# Patient Record
Sex: MALE | Race: WHITE | NOT HISPANIC OR LATINO | URBAN - METROPOLITAN AREA
[De-identification: names, ages, dates, MRNs, and addresses within clinical notes are randomized per-mention and may not be internally consistent; named-entity substitution may affect disease eponyms.]

---

## 2017-01-10 ENCOUNTER — APPOINTMENT (RX ONLY)
Dept: URBAN - METROPOLITAN AREA CLINIC 343 | Facility: CLINIC | Age: 72
Setting detail: DERMATOLOGY
End: 2017-01-10

## 2017-01-10 PROBLEM — Z85.828 PERSONAL HISTORY OF OTHER MALIGNANT NEOPLASM OF SKIN: Status: ACTIVE | Noted: 2017-01-10

## 2017-01-10 PROBLEM — C44.612 BASAL CELL CARCINOMA OF SKIN OF RIGHT UPPER LIMB, INCLUDING SHOULDER: Status: ACTIVE | Noted: 2017-01-10

## 2017-01-10 PROBLEM — I10 ESSENTIAL (PRIMARY) HYPERTENSION: Status: ACTIVE | Noted: 2017-01-10

## 2017-01-10 PROBLEM — E13.9 OTHER SPECIFIED DIABETES MELLITUS WITHOUT COMPLICATIONS: Status: ACTIVE | Noted: 2017-01-10

## 2017-01-10 PROBLEM — I63.50 CEREBRAL INFARCTION DUE TO UNSPECIFIED OCCLUSION OR STENOSIS OF UNSPECIFIED CEREBRAL ARTERY: Status: ACTIVE | Noted: 2017-01-10

## 2017-01-10 PROBLEM — L57.0 ACTINIC KERATOSIS: Status: ACTIVE | Noted: 2017-01-10

## 2017-01-10 PROBLEM — M12.9 ARTHROPATHY, UNSPECIFIED: Status: ACTIVE | Noted: 2017-01-10

## 2017-01-10 PROCEDURE — 17262 DSTRJ MAL LES T/A/L 1.1-2.0: CPT

## 2017-01-10 PROCEDURE — ? CURETTAGE AND DESTRUCTION

## 2017-01-10 NOTE — PROCEDURE: CURETTAGE AND DESTRUCTION
Bill For Surgical Tray: no
Size Of Lesion After Curettage: 1.5
Bill As A Line Item Or As Units: Line Item
Cautery Type: electrodesiccation
Anesthesia Volume In Cc: 3
Consent was obtained from the patient. The risks, benefits and alternatives to therapy were discussed in detail. Specifically, the risks of infection, scarring, bleeding, prolonged wound healing, nerve injury, incomplete removal, allergy to anesthesia and recurrence were addressed. Alternatives to ED&C, such as: surgical removal and XRT were also discussed.  Prior to the procedure, the treatment site was clearly identified and confirmed by the patient. All components of Universal Protocol/PAUSE Rule completed.
What Was Performed First?: Curettage
Size Of Lesion In Cm: 1.2
Additional Information: (Optional): The wound was cleaned, and a band-aid was applied. The patient received detailed post-op instructions.
Detail Level: Detailed
Body Location Override (Optional - Billing Will Still Be Based On Selected Body Map Location If Applicable): right lateral shoulder
Post-Care Instructions: I reviewed with the patient in detail post-care instructions. Patient is to keep the area dry for 48 hours, and not to engage in any swimming until the area is healed. Should the patient develop any fevers, chills, bleeding, severe pain patient will contact the office immediately.
Anesthesia Type: 1% lidocaine with epinephrine

## 2017-08-14 ENCOUNTER — ESTABLISHED PATIENT (OUTPATIENT)
Dept: URBAN - METROPOLITAN AREA CLINIC 36 | Facility: CLINIC | Age: 72
End: 2017-08-14

## 2017-08-14 DIAGNOSIS — Z79.4: ICD-10-CM

## 2017-08-14 DIAGNOSIS — E11.9: ICD-10-CM

## 2017-08-14 DIAGNOSIS — H26.491: ICD-10-CM

## 2017-08-14 PROCEDURE — 3072F LOW RISK FOR RETINOPATHY: CPT

## 2017-08-14 PROCEDURE — 1036F TOBACCO NON-USER: CPT

## 2017-08-14 PROCEDURE — G8785 BP SCRN NO PERF AT INTERVAL: HCPCS

## 2017-08-14 PROCEDURE — 2022F DILAT RTA XM EVC RTNOPTHY: CPT

## 2017-08-14 PROCEDURE — 92015 DETERMINE REFRACTIVE STATE: CPT

## 2017-08-14 PROCEDURE — 92014 COMPRE OPH EXAM EST PT 1/>: CPT

## 2017-08-14 PROCEDURE — G8427 DOCREV CUR MEDS BY ELIG CLIN: HCPCS

## 2017-08-14 ASSESSMENT — VISUAL ACUITY
OD_PH: 20/30
OS_SC: 20/25-1
OS_SC: J3
OS_CC: J1
OD_SC: J3
OD_SC: 20/40-2
OD_CC: J1

## 2017-08-14 ASSESSMENT — TONOMETRY
OS_IOP_MMHG: 13
OD_IOP_MMHG: 12

## 2020-08-19 ENCOUNTER — ESTABLISHED COMPREHENSIVE EXAM (OUTPATIENT)
Dept: URBAN - METROPOLITAN AREA CLINIC 36 | Facility: CLINIC | Age: 75
End: 2020-08-19

## 2020-08-19 DIAGNOSIS — H26.491: ICD-10-CM

## 2020-08-19 DIAGNOSIS — E11.9: ICD-10-CM

## 2020-08-19 DIAGNOSIS — H52.7: ICD-10-CM

## 2020-08-19 DIAGNOSIS — H25.812: ICD-10-CM

## 2020-08-19 DIAGNOSIS — Z79.4: ICD-10-CM

## 2020-08-19 PROCEDURE — 92014 COMPRE OPH EXAM EST PT 1/>: CPT

## 2020-08-19 PROCEDURE — 92015 DETERMINE REFRACTIVE STATE: CPT

## 2020-08-19 ASSESSMENT — TONOMETRY
OD_IOP_MMHG: 18
OS_IOP_MMHG: 18

## 2020-08-19 ASSESSMENT — VISUAL ACUITY
OD_SC: J2
OS_SC: 20/40
OD_SC: 20/40-1
OS_SC: J2
OS_PH: 20/30
OD_PH: 20/30

## 2021-08-20 ENCOUNTER — ESTABLISHED COMPREHENSIVE EXAM (OUTPATIENT)
Dept: URBAN - METROPOLITAN AREA CLINIC 36 | Facility: CLINIC | Age: 76
End: 2021-08-20

## 2021-08-20 DIAGNOSIS — H52.7: ICD-10-CM

## 2021-08-20 DIAGNOSIS — Z96.1: ICD-10-CM

## 2021-08-20 DIAGNOSIS — H26.491: ICD-10-CM

## 2021-08-20 DIAGNOSIS — H25.812: ICD-10-CM

## 2021-08-20 DIAGNOSIS — E11.9: ICD-10-CM

## 2021-08-20 DIAGNOSIS — Z79.4: ICD-10-CM

## 2021-08-20 PROCEDURE — 92014 COMPRE OPH EXAM EST PT 1/>: CPT

## 2021-08-20 PROCEDURE — 92015 DETERMINE REFRACTIVE STATE: CPT

## 2021-08-20 ASSESSMENT — TONOMETRY
OS_IOP_MMHG: 17
OD_IOP_MMHG: 14

## 2021-08-20 ASSESSMENT — VISUAL ACUITY
OD_PH: 20/30
OS_SC: 20/50
OS_PH: 20/40
OD_SC: J2
OD_CC: J1
OS_SC: J2
OS_CC: J2
OD_SC: 20/80+2

## 2022-08-23 ENCOUNTER — COMPREHENSIVE EXAM (OUTPATIENT)
Dept: URBAN - METROPOLITAN AREA CLINIC 36 | Facility: CLINIC | Age: 77
End: 2022-08-23

## 2022-08-23 DIAGNOSIS — H52.7: ICD-10-CM

## 2022-08-23 DIAGNOSIS — Z96.1: ICD-10-CM

## 2022-08-23 DIAGNOSIS — Z79.4: ICD-10-CM

## 2022-08-23 DIAGNOSIS — H25.812: ICD-10-CM

## 2022-08-23 DIAGNOSIS — H26.491: ICD-10-CM

## 2022-08-23 DIAGNOSIS — E11.9: ICD-10-CM

## 2022-08-23 PROCEDURE — 92014 COMPRE OPH EXAM EST PT 1/>: CPT

## 2022-08-23 PROCEDURE — 92015 DETERMINE REFRACTIVE STATE: CPT

## 2022-08-23 ASSESSMENT — TONOMETRY
OD_IOP_MMHG: 16
OS_IOP_MMHG: 16

## 2022-08-23 ASSESSMENT — VISUAL ACUITY
OD_PH: 20/30
OD_SC: 20/50-1
OS_PH: 20/40+2
OS_SC: J2
OS_SC: 20/50-1
OD_SC: J3